# Patient Record
Sex: FEMALE | URBAN - METROPOLITAN AREA
[De-identification: names, ages, dates, MRNs, and addresses within clinical notes are randomized per-mention and may not be internally consistent; named-entity substitution may affect disease eponyms.]

---

## 2020-08-27 ENCOUNTER — OFFICE VISIT (OUTPATIENT)
Dept: URBAN - METROPOLITAN AREA CLINIC 82 | Facility: CLINIC | Age: 50
End: 2020-08-27
Payer: COMMERCIAL

## 2020-08-27 DIAGNOSIS — R10.13 DYSPEPSIA: ICD-10-CM

## 2020-08-27 DIAGNOSIS — K21.9 GERD (GASTROESOPHAGEAL REFLUX DISEASE): ICD-10-CM

## 2020-08-27 DIAGNOSIS — K59.09 CHRONIC CONSTIPATION: ICD-10-CM

## 2020-08-27 DIAGNOSIS — R09.89 GLOBUS SENSATION: ICD-10-CM

## 2020-08-27 PROCEDURE — 1036F TOBACCO NON-USER: CPT | Performed by: INTERNAL MEDICINE

## 2020-08-27 PROCEDURE — G8420 CALC BMI NORM PARAMETERS: HCPCS | Performed by: INTERNAL MEDICINE

## 2020-08-27 PROCEDURE — G9903 PT SCRN TBCO ID AS NON USER: HCPCS | Performed by: INTERNAL MEDICINE

## 2020-08-27 PROCEDURE — G8427 DOCREV CUR MEDS BY ELIG CLIN: HCPCS | Performed by: INTERNAL MEDICINE

## 2020-08-27 PROCEDURE — 99244 OFF/OP CNSLTJ NEW/EST MOD 40: CPT | Performed by: INTERNAL MEDICINE

## 2020-08-27 PROCEDURE — 99204 OFFICE O/P NEW MOD 45 MIN: CPT | Performed by: INTERNAL MEDICINE

## 2020-08-27 RX ORDER — METOCLOPRAMIDE HYDROCHLORIDE 10 MG/1
1 TABLET BEFORE MEALS TABLET ORAL THREE TIMES A DAY
Qty: 45 | Refills: 0 | OUTPATIENT
Start: 2020-08-27

## 2020-08-27 RX ORDER — PANTOPRAZOLE SODIUM 40 MG/1
1 TABLET TABLET, DELAYED RELEASE ORAL ONCE A DAY
Status: ACTIVE | COMMUNITY

## 2020-08-27 NOTE — HPI-TODAY'S VISIT:
08/27/2020 Patient is a 49 year old  female, who presents for evaluation of heartburn/acid reflux. Patient states she feels burning sensation in the throat and chest. She feels globus sensation in the esophagus. She has occasional epigastric pain. Patient states that she was prescribed pantoprazole by Dr. Amaro. She had an upper endoscopy done when she live in [place]. She does report some constipation lately. She has bowel movements every 2-3 days. Denies any melena or hematochezia.

## 2020-09-02 ENCOUNTER — OFFICE VISIT (OUTPATIENT)
Dept: URBAN - METROPOLITAN AREA SURGERY CENTER 13 | Facility: SURGERY CENTER | Age: 50
End: 2020-09-02
Payer: COMMERCIAL

## 2020-09-02 DIAGNOSIS — K29.60 ADENOPAPILLOMATOSIS GASTRICA: ICD-10-CM

## 2020-09-02 DIAGNOSIS — K20.8 CORROSIVE ESOPHAGITIS: ICD-10-CM

## 2020-09-02 DIAGNOSIS — R10.13 ABDOMINAL DISCOMFORT, EPIGASTRIC: ICD-10-CM

## 2020-09-02 DIAGNOSIS — K22.2 ACQUIRED ESOPHAGEAL RING: ICD-10-CM

## 2020-09-02 DIAGNOSIS — R13.19 CERVICAL DYSPHAGIA: ICD-10-CM

## 2020-09-02 PROCEDURE — G8907 PT DOC NO EVENTS ON DISCHARG: HCPCS | Performed by: INTERNAL MEDICINE

## 2020-09-02 PROCEDURE — 43239 EGD BIOPSY SINGLE/MULTIPLE: CPT | Performed by: INTERNAL MEDICINE

## 2020-09-02 PROCEDURE — 43248 EGD GUIDE WIRE INSERTION: CPT | Performed by: INTERNAL MEDICINE

## 2020-10-16 ENCOUNTER — TELEPHONE ENCOUNTER (OUTPATIENT)
Dept: URBAN - METROPOLITAN AREA CLINIC 82 | Facility: CLINIC | Age: 50
End: 2020-10-16

## 2020-10-16 RX ORDER — PANTOPRAZOLE SODIUM 40 MG/1
1 TABLET TABLET, DELAYED RELEASE ORAL ONCE A DAY
Qty: 90 | Refills: 1 | OUTPATIENT
Start: 2020-10-19

## 2020-11-18 ENCOUNTER — OFFICE VISIT (OUTPATIENT)
Dept: URBAN - METROPOLITAN AREA CLINIC 82 | Facility: CLINIC | Age: 50
End: 2020-11-18
Payer: COMMERCIAL

## 2020-11-18 DIAGNOSIS — K21.9 GERD (GASTROESOPHAGEAL REFLUX DISEASE): ICD-10-CM

## 2020-11-18 DIAGNOSIS — R10.13 ABDOMINAL PAIN, EPIGASTRIC: ICD-10-CM

## 2020-11-18 DIAGNOSIS — R09.89 GLOBUS SENSATION: ICD-10-CM

## 2020-11-18 DIAGNOSIS — K59.00 CONSTIPATION: ICD-10-CM

## 2020-11-18 PROBLEM — 29857009 CHEST PAIN: Status: ACTIVE | Noted: 2020-11-18

## 2020-11-18 PROBLEM — 79922009 EPIGASTRIC PAIN: Status: ACTIVE | Noted: 2020-11-18

## 2020-11-18 PROCEDURE — G8420 CALC BMI NORM PARAMETERS: HCPCS | Performed by: INTERNAL MEDICINE

## 2020-11-18 PROCEDURE — 1036F TOBACCO NON-USER: CPT | Performed by: INTERNAL MEDICINE

## 2020-11-18 PROCEDURE — G8427 DOCREV CUR MEDS BY ELIG CLIN: HCPCS | Performed by: INTERNAL MEDICINE

## 2020-11-18 PROCEDURE — G9903 PT SCRN TBCO ID AS NON USER: HCPCS | Performed by: INTERNAL MEDICINE

## 2020-11-18 PROCEDURE — 99214 OFFICE O/P EST MOD 30 MIN: CPT | Performed by: INTERNAL MEDICINE

## 2020-11-18 RX ORDER — PANTOPRAZOLE SODIUM 40 MG/1
1 TABLET TABLET, DELAYED RELEASE ORAL ONCE A DAY
Qty: 90 | Refills: 1 | OUTPATIENT
Start: 2020-11-18

## 2020-11-18 RX ORDER — PANTOPRAZOLE SODIUM 40 MG/1
1 TABLET TABLET, DELAYED RELEASE ORAL ONCE A DAY
Qty: 90 | Refills: 1 | Status: ACTIVE | COMMUNITY
Start: 2020-10-19

## 2020-11-18 RX ORDER — METOCLOPRAMIDE HYDROCHLORIDE 10 MG/1
1 TABLET BEFORE MEALS TABLET ORAL THREE TIMES A DAY
Qty: 45 | Refills: 0 | Status: ACTIVE | COMMUNITY
Start: 2020-08-27

## 2020-11-18 RX ORDER — DICYCLOMINE HYDROCHLORIDE 10 MG/1
1 TABLET CAPSULE ORAL THREE TIMES A DAY
Qty: 90 | Refills: 1 | OUTPATIENT
Start: 2020-11-18 | End: 2021-01-17

## 2020-11-18 RX ORDER — AMITRIPTYLINE HYDROCHLORIDE 10 MG/1
1 TABLET AT BEDTIME TABLET, FILM COATED ORAL ONCE A DAY
Qty: 90 | Refills: 1 | OUTPATIENT
Start: 2020-11-18

## 2020-11-18 RX ORDER — PANTOPRAZOLE SODIUM 40 MG/1
1 TABLET TABLET, DELAYED RELEASE ORAL ONCE A DAY
Status: ACTIVE | COMMUNITY

## 2020-11-18 NOTE — HPI-TODAY'S VISIT:
08/27/2020 Patient is a 49 year old  female, who presents for evaluation of heartburn/acid reflux. Patient states she feels burning sensation in the throat and chest. She feels globus sensation in the esophagus. She has occasional epigastric pain. Patient states that she was prescribed pantoprazole by Dr. Amaro. She had an upper endoscopy done when she live in [place]. She does report some constipation lately. She has bowel movements every 2-3 days. Denies any melena or hematochezia.   11/18/2020 Patient presents for a follow up office visit. EGD done on 09/02/2020 showed schatzki's ring s/p dilatation to 54 [place]. Biopsy showed no H pylori. Patient states she has good days and bad days. She continues to take Protonix daily. She denies any nocturnal symptoms now. She has noticed when she worries too much she has epigastric abdominal pain. She states she feels much better and chest pain did imprve since the last visit.

## 2021-01-20 ENCOUNTER — OFFICE VISIT (OUTPATIENT)
Dept: URBAN - METROPOLITAN AREA CLINIC 82 | Facility: CLINIC | Age: 51
End: 2021-01-20

## 2021-03-02 ENCOUNTER — OFFICE VISIT (OUTPATIENT)
Dept: URBAN - METROPOLITAN AREA CLINIC 82 | Facility: CLINIC | Age: 51
End: 2021-03-02
Payer: COMMERCIAL

## 2021-03-02 DIAGNOSIS — R09.89 GLOBUS SENSATION: ICD-10-CM

## 2021-03-02 DIAGNOSIS — K21.9 GERD (GASTROESOPHAGEAL REFLUX DISEASE): ICD-10-CM

## 2021-03-02 DIAGNOSIS — K58.9 IRRITABLE BOWEL SYNDROME, UNSPECIFIED TYPE: ICD-10-CM

## 2021-03-02 PROBLEM — 14760008 CONSTIPATION: Status: ACTIVE | Noted: 2020-11-18

## 2021-03-02 PROCEDURE — G8420 CALC BMI NORM PARAMETERS: HCPCS | Performed by: INTERNAL MEDICINE

## 2021-03-02 PROCEDURE — G9903 PT SCRN TBCO ID AS NON USER: HCPCS | Performed by: INTERNAL MEDICINE

## 2021-03-02 PROCEDURE — G8427 DOCREV CUR MEDS BY ELIG CLIN: HCPCS | Performed by: INTERNAL MEDICINE

## 2021-03-02 PROCEDURE — 99214 OFFICE O/P EST MOD 30 MIN: CPT | Performed by: INTERNAL MEDICINE

## 2021-03-02 RX ORDER — PANTOPRAZOLE SODIUM 40 MG/1
1 TABLET TABLET, DELAYED RELEASE ORAL ONCE A DAY
Qty: 90 | Refills: 1 | OUTPATIENT
Start: 2021-03-02

## 2021-03-02 RX ORDER — PANTOPRAZOLE SODIUM 40 MG/1
1 TABLET TABLET, DELAYED RELEASE ORAL ONCE A DAY
Qty: 90 | Refills: 1 | Status: DISCONTINUED | COMMUNITY
Start: 2020-11-18

## 2021-03-02 RX ORDER — AMITRIPTYLINE HYDROCHLORIDE 10 MG/1
1 TABLET AT BEDTIME TABLET, FILM COATED ORAL ONCE A DAY
Qty: 90 | Refills: 1 | Status: ACTIVE | COMMUNITY
Start: 2020-11-18

## 2021-03-02 RX ORDER — PANTOPRAZOLE SODIUM 40 MG/1
1 TABLET TABLET, DELAYED RELEASE ORAL ONCE A DAY
Qty: 90 | Refills: 1 | Status: DISCONTINUED | COMMUNITY
Start: 2020-10-19

## 2021-03-02 RX ORDER — PANTOPRAZOLE SODIUM 40 MG/1
1 TABLET TABLET, DELAYED RELEASE ORAL ONCE A DAY
Status: ACTIVE | COMMUNITY

## 2021-03-02 RX ORDER — SUCRALFATE 1 G
1 TABLET ON AN EMPTY STOMACH TABLET ORAL TWICE A DAY
Qty: 180 TABLET | Refills: 1 | OUTPATIENT
Start: 2021-03-02 | End: 2021-08-29

## 2021-03-02 RX ORDER — METOCLOPRAMIDE HYDROCHLORIDE 10 MG/1
1 TABLET BEFORE MEALS TABLET ORAL THREE TIMES A DAY
Qty: 45 | Refills: 0 | Status: ACTIVE | COMMUNITY
Start: 2020-08-27

## 2021-03-02 NOTE — HPI-TODAY'S VISIT:
08/27/2020 Patient is a 49 year old  female, who presents for evaluation of heartburn/acid reflux. Patient states she feels burning sensation in the throat and chest. She feels globus sensation in the esophagus. She has occasional epigastric pain. Patient states that she was prescribed pantoprazole by Dr. Amaro. She had an upper endoscopy done when she live in [place]. She does report some constipation lately. She has bowel movements every 2-3 days. Denies any melena or hematochezia.   11/18/2020 Patient presents for a follow up office visit. EGD done on 09/02/2020 showed schatzki's ring s/p dilatation to 54 [place]. Biopsy showed no H pylori. Patient states she has good days and bad days. She continues to take Protonix daily. She denies any nocturnal symptoms now. She has noticed when she worries too much she has epigastric abdominal pain. She states she feels much better and chest pain did imprve since the last visit.   03/02/2021 Patient presents for a f/u office visit for evaluation of acid reflux. She c/o of a burning sensation and discomfort in her chest and esophagus, occurring more frequently now. Symptoms are intermittent and present for several months. She still takes pantoprazole in the morning. She no longer takes dicyclomine as her bloating has resolved. She has stopped eating at nighttime and avoids spicy foods. Denies thyroid problems. No thyroid swelling on physical examination. She is no longer burping like before. She no longer feels abdominal pain, but she does report a globus sensation in her esophagus and burning in her chest.  She denies dysphagia and vomiting.

## 2021-03-18 ENCOUNTER — TELEPHONE ENCOUNTER (OUTPATIENT)
Dept: URBAN - METROPOLITAN AREA CLINIC 92 | Facility: CLINIC | Age: 51
End: 2021-03-18

## 2021-03-18 RX ORDER — OMEPRAZOLE 40 MG/1
1 CAPSULE 30 MINUTES BEFORE MORNING MEAL CAPSULE, DELAYED RELEASE ORAL ONCE A DAY
Qty: 90 | Refills: 1 | OUTPATIENT
Start: 2021-03-19

## 2021-06-10 ENCOUNTER — OFFICE VISIT (OUTPATIENT)
Dept: URBAN - METROPOLITAN AREA CLINIC 115 | Facility: CLINIC | Age: 51
End: 2021-06-10

## 2021-06-10 ENCOUNTER — OFFICE VISIT (OUTPATIENT)
Dept: URBAN - METROPOLITAN AREA CLINIC 115 | Facility: CLINIC | Age: 51
End: 2021-06-10
Payer: COMMERCIAL

## 2021-06-10 ENCOUNTER — DASHBOARD ENCOUNTERS (OUTPATIENT)
Age: 51
End: 2021-06-10

## 2021-06-10 DIAGNOSIS — K58.9 IRRITABLE BOWEL SYNDROME, UNSPECIFIED TYPE: ICD-10-CM

## 2021-06-10 DIAGNOSIS — K21.9 GERD (GASTROESOPHAGEAL REFLUX DISEASE): ICD-10-CM

## 2021-06-10 DIAGNOSIS — R09.89 GLOBUS SENSATION: ICD-10-CM

## 2021-06-10 PROBLEM — 10743008: Status: ACTIVE | Noted: 2021-03-02

## 2021-06-10 PROBLEM — 235595009 GASTROESOPHAGEAL REFLUX DISEASE: Status: ACTIVE | Noted: 2020-11-18

## 2021-06-10 PROBLEM — 267103008 GLOBUS SENSATION: Status: ACTIVE | Noted: 2020-11-18

## 2021-06-10 PROCEDURE — G9903 PT SCRN TBCO ID AS NON USER: HCPCS | Performed by: INTERNAL MEDICINE

## 2021-06-10 PROCEDURE — G8427 DOCREV CUR MEDS BY ELIG CLIN: HCPCS | Performed by: INTERNAL MEDICINE

## 2021-06-10 PROCEDURE — G8420 CALC BMI NORM PARAMETERS: HCPCS | Performed by: INTERNAL MEDICINE

## 2021-06-10 PROCEDURE — 99214 OFFICE O/P EST MOD 30 MIN: CPT | Performed by: INTERNAL MEDICINE

## 2021-06-10 RX ORDER — PANTOPRAZOLE SODIUM 40 MG/1
1 TABLET TABLET, DELAYED RELEASE ORAL ONCE A DAY
Status: ACTIVE | COMMUNITY

## 2021-06-10 RX ORDER — SUCRALFATE 1 G
1 TABLET ON AN EMPTY STOMACH TABLET ORAL TWICE A DAY
Qty: 180 TABLET | Refills: 1 | OUTPATIENT
Start: 2021-06-10 | End: 2021-12-06

## 2021-06-10 RX ORDER — METOCLOPRAMIDE HYDROCHLORIDE 10 MG/1
1 TABLET BEFORE MEALS TABLET ORAL THREE TIMES A DAY
Qty: 45 | Refills: 0 | Status: ACTIVE | COMMUNITY
Start: 2020-08-27

## 2021-06-10 RX ORDER — SUCRALFATE 1 G
1 TABLET ON AN EMPTY STOMACH TABLET ORAL TWICE A DAY
Qty: 180 TABLET | Refills: 1 | Status: ACTIVE | COMMUNITY
Start: 2021-03-02 | End: 2021-08-29

## 2021-06-10 RX ORDER — DEXLANSOPRAZOLE 60 MG/1
1 CAPSULE CAPSULE, DELAYED RELEASE ORAL ONCE A DAY
Qty: 90 | Refills: 1 | OUTPATIENT
Start: 2021-06-10

## 2021-06-10 RX ORDER — OMEPRAZOLE 40 MG/1
1 CAPSULE 30 MINUTES BEFORE MORNING MEAL CAPSULE, DELAYED RELEASE ORAL ONCE A DAY
Qty: 90 | Refills: 1 | Status: ACTIVE | COMMUNITY
Start: 2021-03-19

## 2021-06-10 RX ORDER — AMITRIPTYLINE HYDROCHLORIDE 10 MG/1
1 TABLET AT BEDTIME TABLET, FILM COATED ORAL ONCE A DAY
Qty: 30 | Refills: 3 | OUTPATIENT
Start: 2021-06-10

## 2021-06-10 RX ORDER — AMITRIPTYLINE HYDROCHLORIDE 10 MG/1
1 TABLET AT BEDTIME TABLET, FILM COATED ORAL ONCE A DAY
Qty: 90 | Refills: 1 | Status: ACTIVE | COMMUNITY
Start: 2020-11-18

## 2021-06-10 RX ORDER — PANTOPRAZOLE SODIUM 40 MG/1
1 TABLET TABLET, DELAYED RELEASE ORAL ONCE A DAY
Qty: 90 | Refills: 1 | Status: ACTIVE | COMMUNITY
Start: 2021-03-02

## 2021-06-10 NOTE — HPI-TODAY'S VISIT:
08/27/2020 Patient is a 49 year old  female, who presents for evaluation of heartburn/acid reflux. Patient states she feels burning sensation in the throat and chest. She feels globus sensation in the esophagus. She has occasional epigastric pain. Patient states that she was prescribed pantoprazole by Dr. Amaro. She had an upper endoscopy done when she live in [place]. She does report some constipation lately. She has bowel movements every 2-3 days. Denies any melena or hematochezia.   11/18/2020 Patient presents for a follow up office visit. EGD done on 09/02/2020 showed schatzki's ring s/p dilatation to 54 [place]. Biopsy showed no H pylori. Patient states she has good days and bad days. She continues to take Protonix daily. She denies any nocturnal symptoms now. She has noticed when she worries too much she has epigastric abdominal pain. She states she feels much better and chest pain did imprve since the last visit.   03/02/2021 Patient presents for a f/u office visit for evaluation of acid reflux. She c/o of a burning sensation and discomfort in her chest and esophagus, occurring more frequently now. Symptoms are intermittent and present for several months. She still takes pantoprazole in the morning. She no longer takes dicyclomine as her bloating has resolved. She has stopped eating at nighttime and avoids spicy foods. Denies thyroid problems. No thyroid swelling on physical examination. She is no longer burping like before. She no longer feels abdominal pain, but she does report a globus sensation in her esophagus and burning in her chest.  She denies dysphagia and vomiting.   06/10/2021 Patient presents for a follow up office visit. EGD on 09/02/2020 showed gastritis and esophagitis. She has been taking pantoprazole and carafate (twice a day), but she is still experiencing the globus sensation in her throat. Patient denies any sinus problems, post nasal drip, or seasonal allergies. She reports that her symptoms of reflux and epigastric pain have improved somewhat since last visit. Patient says she used omeprazole in the past without improvement. Patient also reports her anxiety may be contributing to her symptoms.

## 2021-06-17 ENCOUNTER — TELEPHONE ENCOUNTER (OUTPATIENT)
Dept: URBAN - METROPOLITAN AREA CLINIC 23 | Facility: CLINIC | Age: 51
End: 2021-06-17

## 2021-06-17 RX ORDER — FAMOTIDINE 40 MG/1
1 TABLET TABLET, FILM COATED ORAL TWICE A DAY
Qty: 180 TABLET | Refills: 1 | OUTPATIENT
Start: 2021-06-18

## 2021-11-15 ENCOUNTER — TELEPHONE ENCOUNTER (OUTPATIENT)
Dept: URBAN - METROPOLITAN AREA CLINIC 23 | Facility: CLINIC | Age: 51
End: 2021-11-15

## 2022-07-25 ENCOUNTER — TELEPHONE ENCOUNTER (OUTPATIENT)
Dept: URBAN - METROPOLITAN AREA CLINIC 23 | Facility: CLINIC | Age: 52
End: 2022-07-25

## 2022-08-05 ENCOUNTER — ERX REFILL RESPONSE (OUTPATIENT)
Dept: URBAN - METROPOLITAN AREA CLINIC 82 | Facility: CLINIC | Age: 52
End: 2022-08-05

## 2022-08-05 RX ORDER — SUCRALFATE 1 G/1
TAKE 1 TABLET BY MOUTH TWICE DAILY ON AN EMPTY STOMACH TABLET ORAL
Qty: 180 TABLET | Refills: 0 | OUTPATIENT

## 2023-01-23 ENCOUNTER — OFFICE VISIT (OUTPATIENT)
Dept: URBAN - METROPOLITAN AREA CLINIC 42 | Facility: CLINIC | Age: 53
End: 2023-01-23

## 2023-05-04 ENCOUNTER — OFFICE VISIT (OUTPATIENT)
Dept: URBAN - METROPOLITAN AREA CLINIC 82 | Facility: CLINIC | Age: 53
End: 2023-05-04

## 2023-05-15 ENCOUNTER — OFFICE VISIT (OUTPATIENT)
Dept: URBAN - METROPOLITAN AREA CLINIC 42 | Facility: CLINIC | Age: 53
End: 2023-05-15